# Patient Record
Sex: FEMALE | Race: BLACK OR AFRICAN AMERICAN | URBAN - METROPOLITAN AREA
[De-identification: names, ages, dates, MRNs, and addresses within clinical notes are randomized per-mention and may not be internally consistent; named-entity substitution may affect disease eponyms.]

---

## 2019-05-20 ENCOUNTER — APPOINTMENT (RX ONLY)
Dept: URBAN - METROPOLITAN AREA CLINIC 16 | Facility: CLINIC | Age: 44
Setting detail: DERMATOLOGY
End: 2019-05-20

## 2019-05-20 DIAGNOSIS — B36.0 PITYRIASIS VERSICOLOR: ICD-10-CM

## 2019-05-20 DIAGNOSIS — L30.9 DERMATITIS, UNSPECIFIED: ICD-10-CM

## 2019-05-20 PROBLEM — L70.0 ACNE VULGARIS: Status: ACTIVE | Noted: 2019-05-20

## 2019-05-20 PROCEDURE — 99202 OFFICE O/P NEW SF 15 MIN: CPT | Mod: 25

## 2019-05-20 PROCEDURE — ? DIAGNOSIS COMMENT

## 2019-05-20 PROCEDURE — ? PRESCRIPTION

## 2019-05-20 PROCEDURE — ? KOH PREP

## 2019-05-20 PROCEDURE — 87220 TISSUE EXAM FOR FUNGI: CPT

## 2019-05-20 PROCEDURE — ? COUNSELING

## 2019-05-20 PROCEDURE — ? BIOPSY BY PUNCH METHOD

## 2019-05-20 PROCEDURE — ? TREATMENT REGIMEN

## 2019-05-20 PROCEDURE — 11104 PUNCH BX SKIN SINGLE LESION: CPT

## 2019-05-20 RX ORDER — KETOCONAZOLE 20.5 MG/ML
SHAMPOO, SUSPENSION TOPICAL BIW
Qty: 1 | Refills: 5 | Status: ERX | COMMUNITY
Start: 2019-05-20

## 2019-05-20 RX ORDER — KETOCONAZOLE 20 MG/G
CREAM TOPICAL BID
Qty: 1 | Refills: 5 | Status: ERX | COMMUNITY
Start: 2019-05-20

## 2019-05-20 RX ORDER — FLUCONAZOLE 200 MG/1
TABLET ORAL
Qty: 4 | Refills: 0 | Status: ERX | COMMUNITY
Start: 2019-05-20

## 2019-05-20 RX ADMIN — KETOCONAZOLE: 20.5 SHAMPOO, SUSPENSION TOPICAL at 13:07

## 2019-05-20 RX ADMIN — FLUCONAZOLE: 200 TABLET ORAL at 13:07

## 2019-05-20 RX ADMIN — KETOCONAZOLE: 20 CREAM TOPICAL at 13:07

## 2019-05-20 ASSESSMENT — LOCATION ZONE DERM
LOCATION ZONE: TRUNK
LOCATION ZONE: FEET

## 2019-05-20 ASSESSMENT — LOCATION DETAILED DESCRIPTION DERM
LOCATION DETAILED: LEFT MEDIAL SUPERIOR CHEST
LOCATION DETAILED: RIGHT LATERAL SUPERIOR CHEST
LOCATION DETAILED: LEFT MEDIAL DORSAL FOOT

## 2019-05-20 ASSESSMENT — LOCATION SIMPLE DESCRIPTION DERM
LOCATION SIMPLE: LEFT FOOT
LOCATION SIMPLE: CHEST

## 2019-05-20 NOTE — PROCEDURE: TREATMENT REGIMEN
Initiate Treatment: Start fluconazole 400mg po x 1 dose and repeat 1 week later.\\nStart ketoconazole 2% cream to rash BID x 1 month \\nStart ketoconazole 2% shampoo - use one to a few times per week as maintenance therapy
Detail Level: Simple

## 2019-05-20 NOTE — PROCEDURE: KOH PREP
Koh Procedure Text (Tissue Harvesting Technique): A 15-blade scalpel was used to scrape the skin. The skin scrapings were placed on a glass slide, covered with a coverslip and a KOH solution was applied.
Detail Level: Simple
Koh Intro Text (From The.....): A KOH prep was ordered and evaluated from the
Showing: spores with pseudohyphae

## 2019-05-20 NOTE — PROCEDURE: BIOPSY BY PUNCH METHOD
Path Notes Override (Will Replace All Of The Above Text): 7 year history of eruption that occasionally gets more hyperkeratotic per patient. DDx: linear lichen planus vs lichen striatus favored over linear psoriasis vs epidermal nevus

## 2019-05-30 ENCOUNTER — APPOINTMENT (RX ONLY)
Dept: URBAN - METROPOLITAN AREA CLINIC 16 | Facility: CLINIC | Age: 44
Setting detail: DERMATOLOGY
End: 2019-05-30

## 2019-05-30 DIAGNOSIS — L85.8 OTHER SPECIFIED EPIDERMAL THICKENING: ICD-10-CM

## 2019-05-30 DIAGNOSIS — Z48.02 ENCOUNTER FOR REMOVAL OF SUTURES: ICD-10-CM

## 2019-05-30 DIAGNOSIS — B36.0 PITYRIASIS VERSICOLOR: ICD-10-CM | Status: IMPROVED

## 2019-05-30 PROCEDURE — ? PHOTO-DOCUMENTATION

## 2019-05-30 PROCEDURE — ? COUNSELING

## 2019-05-30 PROCEDURE — ? TREATMENT REGIMEN

## 2019-05-30 PROCEDURE — 99213 OFFICE O/P EST LOW 20 MIN: CPT

## 2019-05-30 PROCEDURE — ? PRESCRIPTION

## 2019-05-30 PROCEDURE — ? DIAGNOSIS COMMENT

## 2019-05-30 PROCEDURE — ? SUTURE REMOVAL (NO GLOBAL PERIOD)

## 2019-05-30 RX ORDER — UREA 40 G/100G
CREAM TOPICAL
Qty: 1 | Refills: 2 | Status: ERX | COMMUNITY
Start: 2019-05-30

## 2019-05-30 RX ADMIN — UREA: 40 CREAM TOPICAL at 13:42

## 2019-05-30 ASSESSMENT — LOCATION DETAILED DESCRIPTION DERM
LOCATION DETAILED: LEFT MEDIAL SUPERIOR CHEST
LOCATION DETAILED: LEFT MEDIAL GREAT TOE
LOCATION DETAILED: LEFT DORSAL GREAT TOE

## 2019-05-30 ASSESSMENT — LOCATION SIMPLE DESCRIPTION DERM
LOCATION SIMPLE: CHEST
LOCATION SIMPLE: LEFT GREAT TOE

## 2019-05-30 ASSESSMENT — LOCATION ZONE DERM
LOCATION ZONE: TOE
LOCATION ZONE: TRUNK

## 2019-05-30 NOTE — PROCEDURE: DIAGNOSIS COMMENT
Comment: Improving, already starting to fade, since starting oral fluconazole and topical ketoconazole. Continue topical therapy as below.

## 2019-05-30 NOTE — PROCEDURE: DIAGNOSIS COMMENT
Comment: Patient with 7 year history of keratotic papules on left dorsal medial foot. Biopsy at last visit under [id number] demonstrated compact hyperkeratosis without significant inflammatory reaction, negative PAS, no evidence of linear lichen planus, lichen striatus, not consistent with epidermal nevus per pathology report. Does not wipe off with alcohol - not consistent with retention hyperkeratosis. Considered possible linear ichthyosis. If spreads or persists, consider repeat biopsy in future if needed.  Start topical therapy with urea 40% cream daily to BID. Comment: Patient with 7 year history of keratotic papules on left dorsal medial foot. Biopsy at last visit under [id number] demonstrated compact hyperkeratosis without significant inflammatory reaction, negative PAS, no evidence of linear lichen planus, lichen striatus, not consistent with epidermal nevus per pathology report. Does not wipe off with alcohol - not consistent with retention hyperkeratosis. Considered possible linear ichthyosis. If spreads or persists, consider repeat biopsy in future if needed.  Start topical therapy with urea 40% cream daily to BID.

## 2019-05-30 NOTE — PROCEDURE: SUTURE REMOVAL (NO GLOBAL PERIOD)
Body Location Override (Optional - Billing Will Still Be Based On Selected Body Map Location If Applicable): Left dorsal medial foot
Detail Level: Simple

## 2019-05-30 NOTE — PROCEDURE: TREATMENT REGIMEN
Detail Level: Simple
Continue Regimen: Continue ketoconazole 2% cream to rash BID x 1 month \\nContinue ketoconazole 2% shampoo - use one to a few times per week as maintenance therapy
Initiate Treatment: Start urea 40% cream to affected area on left foot daily to BID

## 2019-08-16 NOTE — PROCEDURE: MIPS QUALITY
If you are a smoker, it is important for your health to stop smoking. Please be aware that second hand smoke is also harmful.
Detail Level: Detailed
Quality 226: Preventive Care And Screening: Tobacco Use: Screening And Cessation Intervention: Patient screened for tobacco use and is an ex/non-smoker
Quality 110: Preventive Care And Screening: Influenza Immunization: Influenza Immunization Ordered or Recommended, but not Administered due to system reason